# Patient Record
(demographics unavailable — no encounter records)

---

## 2024-11-05 NOTE — HISTORY OF PRESENT ILLNESS
[Parent] : parent [FreeTextEntry1] : follow up  [de-identified] : Nov 5 2024 11:30AM   76 year  old male presents for follow up acc by dtr candy  PMH: HTN HLD< anemia, hugo, DLBCL- seeing hemonc seeing Femi Friend -, DM2 on insulin    hx hep C DJD, mild-moderate spinal stenosis. CAD-CABG  doesnt bring formal med list    lasix prn  only when leg swelling  states not taking gabapentin or remeron   C/o: feeling tired-  last chemo- august-   was recd to see GI and endo at last visit but hasnt seen yet - issues with transportation    sees podiatry - Dr Mike  sees urology- infection - was on antib - pending more tests  Meds:  asa 81  atenolol 100 q d zetia 10  synjardy  losartan 25 metoprolol 50 - vs atenolol 100 ? states taking both   atorvastatin 80 omeprazole 20 amlodipine 10  duloxetine 20  50 un hs  novlog 20 premeal    Allergies: contrast - itchy - one episode -    Health Maintenance: Immunizations:     agreeable to flu shot today      beleives recieved PNA  Social hx/Emotional Health: states doing okay

## 2024-11-05 NOTE — PLAN
[FreeTextEntry1] : EKG obtained to assist in diagnosis and management of assessed problem(s).  - discussed with cardiology - has upcoming appt- ok to wait  pt to stop atenolol and mon BP/HR  parameters given educated re S&S of hyper and hypo tension   and low salt low fat diet   Bp log  monitor and report    vaccine given  Side effects discussed  can use cool, damp cloth to help reduce redness, soreness and/or swelling at immunization site  Patient instructed to notify office with any new or worsening S&S as educated- patient agreeable with plan.    All problems, medications and allergies were assessed and reviewed with the patient. The patients' blood was drawn in office and will be followed and evaluated for any issues. Patient was told to notify the office if any issues arise. Patient agreeable with plan  ate today

## 2024-11-05 NOTE — HISTORY OF PRESENT ILLNESS
[Parent] : parent [FreeTextEntry1] : follow up  [de-identified] : Nov 5 2024 11:30AM   76 year  old male presents for follow up acc by dtr candy  PMH: HTN HLD< anemia, hugo, DLBCL- seeing hemonc seeing Femi Friend -, DM2 on insulin    hx hep C DJD, mild-moderate spinal stenosis. CAD-CABG  doesnt bring formal med list    lasix prn  only when leg swelling  states not taking gabapentin or remeron   C/o: feeling tired-  last chemo- august-   was recd to see GI and endo at last visit but hasnt seen yet - issues with transportation    sees podiatry - Dr Mike  sees urology- infection - was on antib - pending more tests  Meds:  asa 81  atenolol 100 q d zetia 10  synjardy  losartan 25 metoprolol 50 - vs atenolol 100 ? states taking both   atorvastatin 80 omeprazole 20 amlodipine 10  duloxetine 20  50 un hs  novlog 20 premeal    Allergies: contrast - itchy - one episode -    Health Maintenance: Immunizations:     agreeable to flu shot today      beleives recieved PNA  Social hx/Emotional Health: states doing okay

## 2024-12-05 NOTE — DISCUSSION/SUMMARY
[FreeTextEntry1] : S/p CABG: Doing well overall. Some increased dyspnea as of late  Continue aspirin, statin Repeat TTE  HTN: BP decent, continue losartan, metoprolol, amlodipine   HLD: LDL good, TG > 300. Continue statin, zetia. ?Role for fenofibrate. Watch carbs  DVT: Was on Eliquis, now off per onc. Continue to monitor  RV 6M

## 2024-12-05 NOTE — PHYSICAL EXAM

## 2024-12-05 NOTE — HISTORY OF PRESENT ILLNESS
[FreeTextEntry1] : 76M with CAD s/p CABG (2020), HTN, HLD, DM who presents for cardiac eval  Last saw Dr. Vazquez in 11/2023 Pt notes dyspnea x years, maybe worsening as of late Denies CP, lightheadedness Noted in past with episodes of bradycardia  Hx of DVT, was on Eliquis, d/c'ed a few months ago by heme  Hx of diffuse LBCL s/p chemo   Former smoker, many years ago. Retired .   ECG: SR with PAC's, TWI laterally LHC 9/2020: 90% dLM, otherwise mild CAD  TTE 2020: Normal LV function, mild DD  CABG 9/5/20: LIMA to D1, SVG to LAD, Cx (short LIMA- couldn't reach LAD)    Asa 81mg Atorvastatin 80mg Zetia 10mg  Metoprolol 50mg Amlodipine 10mg Losartan 25mg  Synjardy Insulin

## 2024-12-30 NOTE — REASON FOR VISIT
[Follow-Up Visit] : a follow-up visit for [Lymphoma] : lymphoma [Formal Caregiver] : formal caregiver [FreeTextEntry2] : DLBCL

## 2024-12-30 NOTE — ASSESSMENT
[FreeTextEntry1] : 77 yo man with CSIV DLBCL, non-GCB type based on IHC studies reviewed at [facility]. Ki-67 is very high at > 90%. CD30 is expressed. He presented with hypercalcemia and an LDH of 787.He was admitted urgently to complete diagnostic work up. hypercalcemia and treatment of newly diagnosed DLBCL. Patient started cycle 1 with CHOP-R on day 1 (10/10). Course complicated by steroid induced hyperglycemia.  plan:  follow CBC, CMP, LDH neuropathy to feet needles and pins seeing PMRMD L/P x 3 negative  BS better controlled follow with PCP post therapy PET/CT shows completed metabolic response  repeat scan due in 11/ 2024 not done rescheduled  pain RT arm difficult to hold things write or  things will schedule Doppler to evaluate   follow up 3 months

## 2024-12-30 NOTE — HISTORY OF PRESENT ILLNESS
[Disease:__________________________] : Disease: [unfilled] [de-identified] : Mr. Bravo is a 74 yo man with newly diagnosed DLBCL. He has a h/o ASHD (CABG x 3), HTN, HLD, hepatitis C, JAMIE..  Beginning around mid July 2023, he began to develop increased LE weakness, back pain,dyspnea, myalgias, weight loss. He was admitted to [facility]. CTA C/A/P showed prevascular, peripancreatic, jeannie hepatis and retroperitoneal LAD; marked splenomegaly  with multiple splenic masses; there was no evidence of PE; there was an ill defined left basilar infiltrate. MRI L/S spine showed DJD, mild-moderate spinal stenosis. CT guided retroperitoneal node bx showed CD30+ high grade DLBCL.- MUM1, CD20, CD30 (30-35%), BCL-2 and BCL-6 were (+) and CD10, ALK-1, JILL, c-MYC were (-). Ki-67 was > 90%. LDH was 787 on admission, and Ca 12.0. Ca decreased with hydration. FORREST developed post iv contrast, which was reversible. Neurologic assessment for generalized weakness, inability to walk, was inconclusive. MRI of brain showed age related microvascular changes. Before d/c, LE doppler showed right popliteal, post tibial and peroneal veins and anticoagulation was recommended. Path from node bx was not back when pt was sent to [place] for rehab. He remains very weak. He is conversant and lucid. He has not had recent fevers or sweats.      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  interim pet/ct scan showed good response.  post tx PET/CT  scan Compared to FDG PET/CT dated 10/6/2023: results discussed with patient no clear indication from this scan for recent back pain . needs follow uo scans in November wasnt done to be rescheduled needs premeds  smoker from age 24 years till 1 year ago. Daughter tanner is HCP   1. Resolution of FDG-avid lymphadenopathy above and below the diaphragm, FDG-avid intramuscular focus adjacent to left shoulder, splenomegaly, splenic hypermetabolism, and FDG-avid right perinephric soft tissue, compatible with response to interval therapy.  2. Nonspecific diffuse distal small bowel and pancolonic hypermetabolism, increased as compared to prior study, may be related to diabetic medication. Please correlate clinically.  3. The remainder of the study is unremarkable, demonstrating no evidence of FDG-avid disease. The etiology of the patient's back pain and is not delineated on this scan

## 2024-12-30 NOTE — ASSESSMENT
[FreeTextEntry1] : 75 yo man with CSIV DLBCL, non-GCB type based on IHC studies reviewed at [facility]. Ki-67 is very high at > 90%. CD30 is expressed. He presented with hypercalcemia and an LDH of 787.He was admitted urgently to complete diagnostic work up. hypercalcemia and treatment of newly diagnosed DLBCL. Patient started cycle 1 with CHOP-R on day 1 (10/10). Course complicated by steroid induced hyperglycemia.  plan:  follow CBC, CMP, LDH neuropathy to feet needles and pins seeing PMRMD L/P x 3 negative  BS better controlled follow with PCP post therapy PET/CT shows completed metabolic response  repeat scan due in 11/ 2024 not done rescheduled  pain RT arm difficult to hold things write or  things will schedule Doppler to evaluate   follow up 3 months

## 2024-12-30 NOTE — PHYSICAL EXAM
[Restricted in physically strenuous activity but ambulatory and able to carry out work of a light or sedentary nature] : Status 1- Restricted in physically strenuous activity but ambulatory and able to carry out work of a light or sedentary nature, e.g., light house work, office work [Normal] : affect appropriate [de-identified] : uses cane

## 2024-12-30 NOTE — PHYSICAL EXAM
[Restricted in physically strenuous activity but ambulatory and able to carry out work of a light or sedentary nature] : Status 1- Restricted in physically strenuous activity but ambulatory and able to carry out work of a light or sedentary nature, e.g., light house work, office work [Normal] : affect appropriate [de-identified] : uses cane

## 2024-12-30 NOTE — HISTORY OF PRESENT ILLNESS
[Disease:__________________________] : Disease: [unfilled] [de-identified] : Mr. Bravo is a 76 yo man with newly diagnosed DLBCL. He has a h/o ASHD (CABG x 3), HTN, HLD, hepatitis C, JAMIE..  Beginning around mid July 2023, he began to develop increased LE weakness, back pain,dyspnea, myalgias, weight loss. He was admitted to [facility]. CTA C/A/P showed prevascular, peripancreatic, jeannie hepatis and retroperitoneal LAD; marked splenomegaly  with multiple splenic masses; there was no evidence of PE; there was an ill defined left basilar infiltrate. MRI L/S spine showed DJD, mild-moderate spinal stenosis. CT guided retroperitoneal node bx showed CD30+ high grade DLBCL.- MUM1, CD20, CD30 (30-35%), BCL-2 and BCL-6 were (+) and CD10, ALK-1, JILL, c-MYC were (-). Ki-67 was > 90%. LDH was 787 on admission, and Ca 12.0. Ca decreased with hydration. FORREST developed post iv contrast, which was reversible. Neurologic assessment for generalized weakness, inability to walk, was inconclusive. MRI of brain showed age related microvascular changes. Before d/c, LE doppler showed right popliteal, post tibial and peroneal veins and anticoagulation was recommended. Path from node bx was not back when pt was sent to [place] for rehab. He remains very weak. He is conversant and lucid. He has not had recent fevers or sweats.      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  interim pet/ct scan showed good response.  post tx PET/CT  scan Compared to FDG PET/CT dated 10/6/2023: results discussed with patient no clear indication from this scan for recent back pain . needs follow uo scans in November wasnt done to be rescheduled needs premeds  smoker from age 24 years till 1 year ago. Daughter tanner is HCP   1. Resolution of FDG-avid lymphadenopathy above and below the diaphragm, FDG-avid intramuscular focus adjacent to left shoulder, splenomegaly, splenic hypermetabolism, and FDG-avid right perinephric soft tissue, compatible with response to interval therapy.  2. Nonspecific diffuse distal small bowel and pancolonic hypermetabolism, increased as compared to prior study, may be related to diabetic medication. Please correlate clinically.  3. The remainder of the study is unremarkable, demonstrating no evidence of FDG-avid disease. The etiology of the patient's back pain and is not delineated on this scan

## 2025-01-31 NOTE — PHYSICAL EXAM
[Well Developed] : well developed [Well Nourished] : well nourished [No Acute Distress] : no acute distress [Normal Conjunctiva] : normal conjunctiva [Normal Venous Pressure] : normal venous pressure [No Carotid Bruit] : no carotid bruit [Normal S1, S2] : normal S1, S2 [No Murmur] : no murmur [No Rub] : no rub [No Gallop] : no gallop [Clear Lung Fields] : clear lung fields [Normal Gait] : normal gait [No Edema] : no edema [Moves all extremities] : moves all extremities [Normal Speech] : normal speech [Alert and Oriented] : alert and oriented

## 2025-02-02 NOTE — ASSESSMENT
[FreeTextEntry1] : ================================================================= 77 year old man with CAD s/p bypass grafting in 2020, HTN, type 2 diabetes, and DLBCL s/p ~ 287 mg/m2 doxorubicin in 2023.  # Sinus bradycardia, asymptomatic, potentially related to untreated sleep apnea. HR normal currently. - 14 day Zio patch monitor to evaluate for pauses or other symptomatic arrhythmia - educated patient about potential cardiovascular/health effects of JAMIE and recommended he consider treatment - at this time, there seems to be no need to adjust the dose of metoprolol, can review pending results of monitor  # Coronary artery disease s/p bypass 2020: asymptomatic - continue maximal medical therapy under direction of his local cardiologist - cont atorva/ezetimibe - cont aspirin - BP and diabetes control - last lipid panel: 11/6/2024: LDL 31  # At risk for cardiomyopathy due to prior anthracycline chemotherapy for lymphoma - no post-treatment echo - schedule echocardiogram with GLS for post-treatment assessment  # Hypertension, controlled - continue losartan 25 and Toprol XL  50 for now - continue amlodipine 10 mg daily  # DM2: uncontrolled, A1c 8.3 % in Nov 2024 - insulin - meformin-Jardiance combo (Synjardy  5-1000 BID)  Annual follow up with Cardio-Oncology Continue local cardiology care.  Coordinate with daughter, Amairani Fonseca: [phone]  We discussed the above, and all questions were answered to the best of my ability and to the patient's satisfaction.

## 2025-02-02 NOTE — REVIEW OF SYSTEMS
[SOB] : no shortness of breath [Dyspnea on exertion] : not dyspnea during exertion [Chest Discomfort] : no chest discomfort [Lower Ext Edema] : no extremity edema [Palpitations] : no palpitations [Syncope] : no syncope [Joint Pain] : joint pain [Dizziness] : no dizziness [de-identified] : Positive for hand tremors and imbalance

## 2025-02-02 NOTE — HISTORY OF PRESENT ILLNESS
[FreeTextEntry1] : Erik Bravo was referred by his Hematology/Oncologist due to concerns about a slow heart rate.  The patient reports being informed by a home-visiting nurse that his heart rate was slow. He has a history of CAD and prior PCI. In  he presented with chest pain and angiogram disclosed 90 % distal LM disease, for which coronary bypass was performed. He was diagnosed with DLBCL in  and received 6 cycles of RCHOP with intrathecal methotrexate.  The patient experiences ongoing pain/numbness in the right arm following removal of a PICC line last year. The patient also reports a history of imbalance since receiving chemotherapy for lymphoma. He has no history of syncope, dizziness. or feeling light headed. He was diagnosed with obstructive sleep apnea, but has not used CPAP for many years.  He is on good medical therapy for CAD under the guidance of his local cardiologist, Dr. Kinsey.  Lymphoma treatment transpired without clinical cardiotoxicity, but he has not had a follow up echo post-anthracycline therapy.  Past Medical History: - CABG 20: LIMA to D1, SVG to LAD, Cx (short LIMA- couldn't reach LAD) - PCI prior to  - remote LE DVT - Sleep apnea - does not use CPAP - appendectomy - type 2 diabetes, A1c - 8.3 % in 2024  Social History: - Lives with wife in [place] - Former , retired. - former smoker, quit in  - his daughter, Amairani is his HCP and coordinates his medical care  Cardiovascular Summary: ---------------------------------------------- EC2025: sinus w sinus arrhythmia, LVH, anterior infarct age undetermined 2024: NSR w PACs, LVH w repolarization abnormality ---------------------------------------------- Echo: 10/3/2023: EF 61 %, grade 1 diastolic dysfunction, mild-mod MR, normal RV size/function, PASP 40, TR/MR increased from prior : normal LV function, mild diastolic dysfunction ---------------------------------------------- Cath: 2020: LHC  90% dLM, otherwise mild CAD ---------------------------------------------- CT/MRI ---------------------------------------------- Remote/ambulatory rhythm monitoring:

## 2025-02-02 NOTE — REVIEW OF SYSTEMS
[SOB] : no shortness of breath [Dyspnea on exertion] : not dyspnea during exertion [Chest Discomfort] : no chest discomfort [Lower Ext Edema] : no extremity edema [Palpitations] : no palpitations [Syncope] : no syncope [Joint Pain] : joint pain [Dizziness] : no dizziness [de-identified] : Positive for hand tremors and imbalance

## 2025-02-02 NOTE — HISTORY OF PRESENT ILLNESS
[FreeTextEntry1] : Erik Bravo was referred by his Hematology/Oncologist due to concerns about a slow heart rate.  The patient reports being informed by a home-visiting nurse that his heart rate was slow. He has a history of CAD and prior PCI. In  he presented with chest pain and angiogram disclosed 90 % distal LM disease, for which coronary bypass was performed. He was diagnosed with DLBCL in  and received 6 cycles of RCHOP with intrathecal methotrexate.  The patient experiences ongoing pain/numbness in the right arm following removal of a PICC line last year. The patient also reports a history of imbalance since receiving chemotherapy for lymphoma. He has no history of syncope, dizziness. or feeling light headed. He was diagnosed with obstructive sleep apnea, but has not used CPAP for many years.  He is on good medical therapy for CAD under the guidance of his local cardiologist, Dr. Kinsey.  Lymphoma treatment transpired without clinical cardiotoxicity, but he has not had a follow up echo post-anthracycline therapy.  Past Medical History: - CABG 20: LIMA to D1, SVG to LAD, Cx (short LIMA- couldn't reach LAD) - PCI prior to  - remote LE DVT - Sleep apnea - does not use CPAP - appendectomy - type 2 diabetes, A1c - 8.3 % in 2024  Social History: - Lives with wife in [place] - Former , retired. - former smoker, quit in  - his daughter, Amairani is his HCP and coordinates his medical care  Cardiovascular Summary: ---------------------------------------------- EC2025: sinus w sinus arrhythmia, LVH, anterior infarct age undetermined 2024: NSR w PACs, LVH w repolarization abnormality ---------------------------------------------- Echo: 10/3/2023: EF 61 %, grade 1 diastolic dysfunction, mild-mod MR, normal RV size/function, PASP 40, TR/MR increased from prior : normal LV function, mild diastolic dysfunction ---------------------------------------------- Cath: 2020: LHC  90% dLM, otherwise mild CAD ---------------------------------------------- CT/MRI ---------------------------------------------- Remote/ambulatory rhythm monitoring:

## 2025-02-02 NOTE — REASON FOR VISIT
[Family Member] : family member [FreeTextEntry3] : Dr. Friend [FreeTextEntry1] : ================================================================= ASH MARTINES is a 77 year old man with type 2 diabetes, hypertension, HCV, CAD, and DLBCL seen for decreased heart rate.  Prior Cancer Treatments: ------------------------------------------------------------------------ Chemo/targeted therapy: 2023-2/15/2025: JOAQUIMHOP x 6 w intrathecal MTX ------------------------------------------------------------------------

## 2025-02-02 NOTE — ASSESSMENT
[FreeTextEntry1] : ================================================================= 77 year old man with CAD s/p bypass grafting in 2020, HTN, type 2 diabetes, and DLBCL s/p ~ 287 mg/m2 doxorubicin in 2023.  # Sinus bradycardia, asymptomatic, potentially related to untreated sleep apnea. HR normal currently. - 14 day Zio patch monitor to evaluate for pauses or other symptomatic arrhythmia - educated patient about potential cardiovascular/health effects of JAMIE and recommended he consider treatment - at this time, there seems to be no need to adjust the dose of metoprolol, can review pending results of monitor  # Coronary artery disease s/p bypass 2020: asymptomatic - continue maximal medical therapy under direction of his local cardiologist - cont atorva/ezetimibe - cont aspirin - BP and diabetes control - last lipid panel: 11/6/2024: LDL 31  # At risk for cardiomyopathy due to prior anthracycline chemotherapy for lymphoma - no post-treatment echo - schedule echocardiogram with GLS for post-treatment assessment  # Hypertension, controlled - continue losartan 25 and Toprol XL  50 for now - continue amlodipine 10 mg daily  # DM2: uncontrolled, A1c 8.3 % in Nov 2024 - insulin - meformin-Jardiance combo (Synjardy  5-1000 BID)  Annual follow up with Cardio-Oncology Continue local cardiology care.  Coordinate with daughter, Amairani Fonseca: [phone]  We discussed the above, and all questions were answered to the best of my ability and to the patient's satisfaction.

## 2025-02-02 NOTE — REVIEW OF SYSTEMS
[SOB] : no shortness of breath [Dyspnea on exertion] : not dyspnea during exertion [Chest Discomfort] : no chest discomfort [Lower Ext Edema] : no extremity edema [Palpitations] : no palpitations [Syncope] : no syncope [Joint Pain] : joint pain [Dizziness] : no dizziness [de-identified] : Positive for hand tremors and imbalance

## 2025-02-02 NOTE — ASSESSMENT
[FreeTextEntry1] : ================================================================= 77 year old man with CAD s/p bypass grafting in 2020, HTN, type 2 diabetes, and DLBCL s/p ~ 287 mg/m2 doxorubicin in 2023.  # Sinus bradycardia, asymptomatic, potentially related to untreated sleep apnea. HR normal currently. - 14 day Zio patch monitor to evaluate for pauses or other symptomatic arrhythmia - educated patient about potential cardiovascular/health effects of JAMIE and recommended he consider treatment - at this time, there seems to be no need to adjust the dose of metoprolol, can review pending results of monitor  # Coronary artery disease s/p bypass 2020: asymptomatic - continue maximal medical therapy under direction of his local cardiologist - cont atorva/ezetimibe - cont aspirin - BP and diabetes control - last lipid panel: 11/6/2024: LDL 31  # At risk for cardiomyopathy due to prior anthracycline chemotherapy for lymphoma - no post-treatment echo - schedule echocardiogram with GLS for post-treatment assessment  # Hypertension, controlled - continue losartan 25 and Toprol XL  50 for now - continue amlodipine 10 mg daily  # DM2: uncontrolled, A1c 8.3 % in Nov 2024 - insulin - meformin-Jardiance combo (Synjardy  5-1000 BID)  Annual follow up with Cardio-Oncology Continue local cardiology care.  Coordinate with daughter, Amairani Fonseca: [phone]  We discussed the above, and all questions were answered to the best of my ability and to the patient's satisfaction.

## 2025-02-02 NOTE — HISTORY OF PRESENT ILLNESS
[FreeTextEntry1] : Erik Bravo was referred by his Hematology/Oncologist due to concerns about a slow heart rate.  The patient reports being informed by a home-visiting nurse that his heart rate was slow. He has a history of CAD and prior PCI. In  he presented with chest pain and angiogram disclosed 90 % distal LM disease, for which coronary bypass was performed. He was diagnosed with DLBCL in  and received 6 cycles of RCHOP with intrathecal methotrexate.  The patient experiences ongoing pain/numbness in the right arm following removal of a PICC line last year. The patient also reports a history of imbalance since receiving chemotherapy for lymphoma. He has no history of syncope, dizziness. or feeling light headed. He was diagnosed with obstructive sleep apnea, but has not used CPAP for many years.  He is on good medical therapy for CAD under the guidance of his local cardiologist, Dr. Kinsey.  Lymphoma treatment transpired without clinical cardiotoxicity, but he has not had a follow up echo post-anthracycline therapy.  Past Medical History: - CABG 20: LIMA to D1, SVG to LAD, Cx (short LIMA- couldn't reach LAD) - PCI prior to  - remote LE DVT - Sleep apnea - does not use CPAP - appendectomy - type 2 diabetes, A1c - 8.3 % in 2024  Social History: - Lives with wife in [place] - Former , retired. - former smoker, quit in  - his daughter, Amairani is his HCP and coordinates his medical care  Cardiovascular Summary: ---------------------------------------------- EC2025: sinus w sinus arrhythmia, LVH, anterior infarct age undetermined 2024: NSR w PACs, LVH w repolarization abnormality ---------------------------------------------- Echo: 10/3/2023: EF 61 %, grade 1 diastolic dysfunction, mild-mod MR, normal RV size/function, PASP 40, TR/MR increased from prior : normal LV function, mild diastolic dysfunction ---------------------------------------------- Cath: 2020: LHC  90% dLM, otherwise mild CAD ---------------------------------------------- CT/MRI ---------------------------------------------- Remote/ambulatory rhythm monitoring:

## 2025-05-02 NOTE — PHYSICAL EXAM
[Restricted in physically strenuous activity but ambulatory and able to carry out work of a light or sedentary nature] : Status 1- Restricted in physically strenuous activity but ambulatory and able to carry out work of a light or sedentary nature, e.g., light house work, office work [Normal] : normal spine exam without palpable tenderness, no kyphosis or scoliosis [de-identified] : uses cane  [de-identified] : non focal, mildly unsteady gait, using cane

## 2025-05-02 NOTE — REVIEW OF SYSTEMS
[Diarrhea: Grade 0] : Diarrhea: Grade 0 [Dizziness] : dizziness [Negative] : Allergic/Immunologic [Fatigue] : fatigue [Dysphagia] : dysphagia [Fever] : no fever [Night Sweats] : no night sweats [Odynophagia] : no odynophagia [Mucosal Pain] : no mucosal pain [FreeTextEntry4] : dysphagia for solids - throat/upper esophagus [de-identified] : chronic distal LE PN, stable [de-identified] : says glu controlled

## 2025-05-02 NOTE — HISTORY OF PRESENT ILLNESS
[Disease:__________________________] : Disease: [unfilled] [de-identified] : Mr. Bravo is a 74 yo man with newly diagnosed DLBCL. He has a h/o ASHD (CABG x 3), HTN, HLD, hepatitis C, JAMIE..  Beginning around mid July 2023, he began to develop increased LE weakness, back pain,dyspnea, myalgias, weight loss. He was admitted to [facility]. CTA C/A/P showed prevascular, peripancreatic, jeannie hepatis and retroperitoneal LAD; marked splenomegaly  with multiple splenic masses; there was no evidence of PE; there was an ill defined left basilar infiltrate. MRI L/S spine showed DJD, mild-moderate spinal stenosis. CT guided retroperitoneal node bx showed CD30+ high grade DLBCL.- MUM1, CD20, CD30 (30-35%), BCL-2 and BCL-6 were (+) and CD10, ALK-1, JILL, c-MYC were (-). Ki-67 was > 90%. LDH was 787 on admission, and Ca 12.0. Ca decreased with hydration. FORREST developed post iv contrast, which was reversible. Neurologic assessment for generalized weakness, inability to walk, was inconclusive. MRI of brain showed age related microvascular changes. Before d/c, LE doppler showed right popliteal, post tibial and peroneal veins and anticoagulation was recommended. Path from node bx was not back when pt was sent to [place] for rehab. He remains very weak. He is conversant and lucid. He has not had recent fevers or sweats.      [FreeTextEntry1] : R-CHOP x 6 completed 2/15/2024 [de-identified] : DLBCL - completed R-CHOP x 6 with IT MTX x 3 decr in LE PN   post tx PET/CT  5/28/24 c/w FDG PET/CT 10/6/2023:showed complete metabolic response.  planned f/u CT scans in November, did not schedule Rt arm antecubital pain resolved no constitutional c/o less LBP

## 2025-05-02 NOTE — ASSESSMENT
[Curative] : Goals of care discussed with patient: Curative [Palliative Care Plan] : not applicable at this time [FreeTextEntry1] : 76 yo man with CSIV DLBCL, non-GCB type based on IHC studies reviewed at [facility]. Ki-67 was very high at > 90%. CD30 (+). He presented with hypercalcemia and an LDH of 787. Now s/p R-CHOP x 6, IT MTX x 3, completed 2/15/2024 post therapy PET/CT shows completed metabolic response  CBC results reviewed and d/w pt WBC 6.35, Hgb 12.4, Plt 192K, nl diff repeat imaging was due in 11/ 2024 but was not done plan to f/u treated aggressive lymphoma with CT scans up until two yrs post ending therapy PN better Gait unsteady, could benefit from PT dysphagia of unclear cause: exam of oropharynx neg, no problems swallowing liquids. Will need to be seen by ENT after completing his overdue imaging. Plan CT N/C/A/P with contrast, will need steroid premed prior to iv contrast check CMP, LDH, B2MG  RV 3 months

## 2025-05-02 NOTE — HISTORY OF PRESENT ILLNESS
[Disease:__________________________] : Disease: [unfilled] [de-identified] : Mr. Bravo is a 74 yo man with newly diagnosed DLBCL. He has a h/o ASHD (CABG x 3), HTN, HLD, hepatitis C, JAMIE..  Beginning around mid July 2023, he began to develop increased LE weakness, back pain,dyspnea, myalgias, weight loss. He was admitted to [facility]. CTA C/A/P showed prevascular, peripancreatic, jeannie hepatis and retroperitoneal LAD; marked splenomegaly  with multiple splenic masses; there was no evidence of PE; there was an ill defined left basilar infiltrate. MRI L/S spine showed DJD, mild-moderate spinal stenosis. CT guided retroperitoneal node bx showed CD30+ high grade DLBCL.- MUM1, CD20, CD30 (30-35%), BCL-2 and BCL-6 were (+) and CD10, ALK-1, JILL, c-MYC were (-). Ki-67 was > 90%. LDH was 787 on admission, and Ca 12.0. Ca decreased with hydration. FORREST developed post iv contrast, which was reversible. Neurologic assessment for generalized weakness, inability to walk, was inconclusive. MRI of brain showed age related microvascular changes. Before d/c, LE doppler showed right popliteal, post tibial and peroneal veins and anticoagulation was recommended. Path from node bx was not back when pt was sent to [place] for rehab. He remains very weak. He is conversant and lucid. He has not had recent fevers or sweats.      [FreeTextEntry1] : R-CHOP x 6 completed 2/15/2024 [de-identified] : DLBCL - completed R-CHOP x 6 with IT MTX x 3 decr in LE PN   post tx PET/CT  5/28/24 c/w FDG PET/CT 10/6/2023:showed complete metabolic response.  planned f/u CT scans in November, did not schedule Rt arm antecubital pain resolved no constitutional c/o less LBP

## 2025-05-02 NOTE — REVIEW OF SYSTEMS
[Diarrhea: Grade 0] : Diarrhea: Grade 0 [Dizziness] : dizziness [Negative] : Allergic/Immunologic [Fatigue] : fatigue [Dysphagia] : dysphagia [Fever] : no fever [Night Sweats] : no night sweats [Odynophagia] : no odynophagia [Mucosal Pain] : no mucosal pain [FreeTextEntry4] : dysphagia for solids - throat/upper esophagus [de-identified] : chronic distal LE PN, stable [de-identified] : says glu controlled

## 2025-05-02 NOTE — PHYSICAL EXAM
[Restricted in physically strenuous activity but ambulatory and able to carry out work of a light or sedentary nature] : Status 1- Restricted in physically strenuous activity but ambulatory and able to carry out work of a light or sedentary nature, e.g., light house work, office work [Normal] : normal spine exam without palpable tenderness, no kyphosis or scoliosis [de-identified] : uses cane  [de-identified] : non focal, mildly unsteady gait, using cane

## 2025-05-27 NOTE — HISTORY OF PRESENT ILLNESS
[FreeTextEntry1] : F/u   [de-identified] : 77-year-old male presents for re-establishment of primary care after a gap of approximately two years. During this time, he has been under the care of cardiology, hematology, physical medicine & rehab, He denies any acute medical complaints today. No recent hospitalizations or emergency visits reported. Currently in remission from B-cell lymphoma, with last chemotherapy completed in October 2024. Willing to update baseline labs and resume routine follow-up. Cardiology scheduled pt for Echo.

## 2025-05-27 NOTE — HISTORY OF PRESENT ILLNESS
[FreeTextEntry1] : F/u   [de-identified] : 77-year-old male presents for re-establishment of primary care after a gap of approximately two years. During this time, he has been under the care of cardiology, hematology, physical medicine & rehab, He denies any acute medical complaints today. No recent hospitalizations or emergency visits reported. Currently in remission from B-cell lymphoma, with last chemotherapy completed in October 2024. Willing to update baseline labs and resume routine follow-up. Cardiology scheduled pt for Echo.

## 2025-05-27 NOTE — ASSESSMENT
[FreeTextEntry1] : ================================================================= 77 year old man with CAD s/p bypass grafting in 2020, HTN, type 2 diabetes, and DLBCL s/p ~ 287 mg/m2 doxorubicin in 2023.  # Sinus bradycardia, asymptomatic, potentially related to untreated sleep apnea. HR normal currently. - 14 day Zio patch monitor to evaluate for pauses or other symptomatic arrhythmia - educated patient about potential cardiovascular/health effects of JAMIE and recommended he consider treatment - at this time, there seems to be no need to adjust the dose of metoprolol, can review pending results of monitor  # Coronary artery disease s/p bypass 2020: asymptomatic - continue maximal medical therapy under direction of his local cardiologist - cont atorva/ezetimibe - cont aspirin - BP and diabetes control - last lipid panel: 11/6/2024: LDL 31  # At risk for cardiomyopathy due to prior anthracycline chemotherapy for lymphoma - no post-treatment echo - schedule echocardiogram with GLS for post-treatment assessment  # Hypertension, controlled - continue losartan 25 and Toprol XL  50 for now - continue amlodipine 10 mg daily  # DM2: uncontrolled, A1c 8.3 % in Nov 2024 - insulin - meformin-Jardiance combo (Synjardy  5-1000 BID)    We discussed the above, and all questions were answered to the best of my ability and to the patient's satisfaction.

## 2025-06-02 NOTE — DATA REVIEWED
[Other: ___] : [unfilled] [FreeTextEntry1] :  MRI of the lumbar spine performed on September 10, 2023 reveals L5-S1 degenerative disc disease with disc bulge contributing to right and moderate left neuroforaminal stenosis.  Small annular disc bulge L4-5 with out significant stenosis.  BUN 12 creatinine 0.95.Patient should be able to tolerate an NSAID Hemoglobin A1c is 8.2%.  Will need to avoid use of steroids

## 2025-06-02 NOTE — HISTORY OF PRESENT ILLNESS
[FreeTextEntry1] : 76 year old male presents Low back pain and right heel pain  low back pain Pain:  9/10 Worse: 10/10 Quality: sharp  Frequency: constant He fell back wards 1 year ago.  He was admitted to [facility] for cancer- treatment s/p chemotherapy- His lymphoma is in remission. He went to his cancer MD. His cancer remains in rmission.  His pain starts across the low back and radiates into the buttocks.  Rarely the pain radiates below the knee.  He recently had x-rays.  The results were reported negative.  He did not bring the result for my review. The pain is aggravated with bending forward.His knees do not buckle.  He states the pain does not radiate to his foot.  right heel pain Pain:  8/10 Worse: 10/10 Quality: sharp  Frequency: constant The pain starts in the anterior heel.  The pain is worse with weightbearing through the foot.  He also has pain when he takes his first step in the morning.

## 2025-06-02 NOTE — HISTORY OF PRESENT ILLNESS
[FreeTextEntry1] : 76 year old male presents Low back pain and right heel pain  low back pain Pain:  9/10 Worse: 10/10 Quality: sharp  Frequency: constant He fell back wards 1 year ago.  He was admitted to [facility] for cancer- treatment s/p chemotherapy- His lymphoma is in remission. He went to his cancer MD. His cancer remains in rmission.  His pain starts across the low back and radiates into the buttocks.  Rarely the pain radiates below the knee.  He recently had x-rays.  The results were reported negative.  He did not bring the result for my review. The pain is aggravated with bending forward.His knees do not buckle.  He states the pain does not radiate to his foot.  right heel pain Pain:  8/10 Worse: 10/10 Quality: sharp  Frequency: constant The pain starts in the anterior heel.  The pain is worse with weightbearing through the foot.  He also has pain when he takes his first step in the morning.

## 2025-06-02 NOTE — PHYSICAL EXAM
[FreeTextEntry1] : Pleasant, in no distress. Language: English HEENT: Head: no trauma. Eyes: no discharge. Ears: No discharge. Nose No discharge. Throat: clear Neck: FAROM. Negative Spurlings Heart: RR, +S1, S2 Lungs: CTA Abdomen: soft, NT Lumbar spine: FAROM, No paraspinal spasm.  LUE: Shoulder:FAROM, MS 5/5 Elbow: FAROM, MS 5/5 reflexes 2/4 Wrist: FAROM, MS 5/5 reflexes 2/4 Warm, nontender, pulse 2+  RUE:Shoulder:FAROM, MS 5/5 Elbow: FAROM, MS 5/5 reflexes 2/4 Wrist: FAROM, MS 5/5 reflexes 2/4 Warm, nontender, pulse 2+  LLE: Hip: FAROM, MS 5/5 Knee: FAROM, MS 5/5 reflexes 2/4 Ankle: FAROM, MS 5/5 reflexes 2/4 Warm , nontender, pulse 2+ negative homans  RLE: Hip: FAROM, MS 5/5 Knee: FAROM, MS 5/5 reflexes 2/4 Ankle: FAROM, MS 5/5 reflexes 2/4 Warm , nontender, pulse 2+ negative homans  Gait: Spontaneous, reciprocal, safe without an assistive device  Sensation RUE: sensation is intact to light touch, pinprick  and proprioception LUE: sensation is intact to light touch, pinprick  and proprioception RLE: sensation is intact to light touch, pinprick  and proprioception. Neg SLR. Neg RUFUS, Neg TIMIR LLE: sensation is intact to light touch, pinprick  and proprioception. Neg SLR. Neg RUFUS, Neg TIMIR

## 2025-06-09 NOTE — HISTORY OF PRESENT ILLNESS
[FreeTextEntry1] : 77M with CAD s/p CABG (2020), HTN, HLD, DM who presents for follow up  Still notes occasional LANZA Had TTE done last week, results as below Denies CP, lightheadedness Noted in past with episodes of bradycardia Saw Dr. Long cardio-onc for bradycardia, ZIO results with normal HR trends.  Hx of DVT, was on Eliquis, d/c'ed by heme  Hx of diffuse LBCL s/p chemo   Former smoker, many years ago. Retired .   ECG: SR with PAC's, TWI laterally LHC 9/2020: 90% dLM, otherwise mild CAD  TTE 6/2025:  1. Left ventricular cavity is normal in size. Left ventricular wall thickness is normal. Left ventricular systolic function is normal with an ejection fraction of 59 % by Mendoza's method of disks. There are no regional wall motion abnormalities seen. 2. There is mild (grade 1) left ventricular diastolic dysfunction, with normal left ventricular filling pressure. 3. Normal right ventricular cavity size, with normal wall thickness, and normal right ventricular systolic function. Tricuspid annular plane systolic excursion (TAPSE) is 1.9 cm (normal >=1.7 cm). 4. Left atrium is mildly dilated. 5. Mild to moderate mitral regurgitation. 6. Mild pulmonic regurgitation.  CABG 9/5/20: LIMA to D1, SVG to LAD, Cx (short LIMA- couldn't reach LAD)   ZIO 2/2025: 21.6% PAC burden, 4 beats NSVT   Asa 81mg Atorvastatin 80mg Zetia 10mg  Metoprolol 50mg Amlodipine 10mg Losartan 25mg  Synjardy Insulin

## 2025-06-09 NOTE — DISCUSSION/SUMMARY
[FreeTextEntry1] : S/p CABG: Doing well overall. Notes some LANZA, TTE stable  Continue aspirin, statin  HTN: BP decent today, high at times, continue losartan, metoprolol, amlodipine   HLD: LDL good, TG mildly elevated but improved from prior. Continue statin, zetia. ?Role for fenofibrate. Watch carbs  DVT: Was on Eliquis, now off per onc. Continue to monitor  PAC's: 21% burden on ZIO. No symptoms of palpitations, normal LV function. Continue metoprolol. Monitor cardiac function closely   RV 6M

## 2025-06-09 NOTE — PHYSICAL EXAM
[Well Developed] : well developed [Well Nourished] : well nourished [No Acute Distress] : no acute distress [Normal Conjunctiva] : normal conjunctiva [Normal Venous Pressure] : normal venous pressure [No Carotid Bruit] : no carotid bruit [Normal S1, S2] : normal S1, S2 [No Murmur] : no murmur [No Rub] : no rub [No Gallop] : no gallop [Clear Lung Fields] : clear lung fields [No Respiratory Distress] : no respiratory distress  [Good Air Entry] : good air entry [Soft] : abdomen soft [Non Tender] : non-tender [No Masses/organomegaly] : no masses/organomegaly [Normal Bowel Sounds] : normal bowel sounds [No Edema] : no edema [Normal Gait] : normal gait [No Cyanosis] : no cyanosis [No Clubbing] : no clubbing [No Varicosities] : no varicosities [No Rash] : no rash [No Skin Lesions] : no skin lesions [Moves all extremities] : moves all extremities [No Focal Deficits] : no focal deficits [Normal Speech] : normal speech [Alert and Oriented] : alert and oriented [Normal memory] : normal memory [de-identified] : extrasystoles

## 2025-07-23 NOTE — ASSESSMENT
[Curative] : Goals of care discussed with patient: Curative [Palliative Care Plan] : not applicable at this time [FreeTextEntry1] : 78 yo man with CSIV DLBCL, non-GCB type based on IHC studies reviewed at [facility]. Ki-67 was very high at > 90%. CD30 (+). He presented with hypercalcemia and an LDH of 787. Now s/p R-CHOP x 6, IT MTX x 3, completed 2/15/2024 post therapy PET/CT shows completed metabolic response  CBC results reviewed and d/w pt WBC 6.35, Hgb 12.4, Plt 192K, nl diff repeat imaging was due in 11/ 2024 but was not done plan to f/u treated aggressive lymphoma with CT scans up until two yrs post ending therapy PN better Gait unsteady, could benefit from PT dysphagia of unclear cause: exam of oropharynx neg, no problems swallowing liquids. Will need to be seen by ENT after completing his overdue imaging. Plan CT N/C/A/P with contrast, will need steroid premed prior to iv contrast check CMP, LDH, B2MG  RV 3 months

## 2025-07-23 NOTE — HISTORY OF PRESENT ILLNESS
[Disease:__________________________] : Disease: [unfilled] [de-identified] : Mr. Bravo is a 74 yo man with newly diagnosed DLBCL. He has a h/o ASHD (CABG x 3), HTN, HLD, hepatitis C, JAMIE..  Beginning around mid July 2023, he began to develop increased LE weakness, back pain,dyspnea, myalgias, weight loss. He was admitted to [facility]. CTA C/A/P showed prevascular, peripancreatic, jeannie hepatis and retroperitoneal LAD; marked splenomegaly  with multiple splenic masses; there was no evidence of PE; there was an ill defined left basilar infiltrate. MRI L/S spine showed DJD, mild-moderate spinal stenosis. CT guided retroperitoneal node bx showed CD30+ high grade DLBCL.- MUM1, CD20, CD30 (30-35%), BCL-2 and BCL-6 were (+) and CD10, ALK-1, JILL, c-MYC were (-). Ki-67 was > 90%. LDH was 787 on admission, and Ca 12.0. Ca decreased with hydration. FORREST developed post iv contrast, which was reversible. Neurologic assessment for generalized weakness, inability to walk, was inconclusive. MRI of brain showed age related microvascular changes. Before d/c, LE doppler showed right popliteal, post tibial and peroneal veins and anticoagulation was recommended. Path from node bx was not back when pt was sent to [place] for rehab. He remains very weak. He is conversant and lucid. He has not had recent fevers or sweats.      [FreeTextEntry1] : R-CHOP x 6 completed 2/15/2024 [de-identified] : DLBCL - completed R-CHOP x 6 with IT MTX x 3 decr in LE PN   post tx PET/CT  5/28/24 c/w FDG PET/CT 10/6/2023:showed complete metabolic response.  planned f/u CT scans in November, did not schedule Rt arm antecubital pain resolved no constitutional c/o less LBP

## 2025-07-23 NOTE — REVIEW OF SYSTEMS
[Fatigue] : fatigue [Diarrhea: Grade 0] : Diarrhea: Grade 0 [Dizziness] : dizziness [Negative] : Allergic/Immunologic [Dysphagia] : no dysphagia [Muscle Pain] : muscle pain [FreeTextEntry9] : c/o pain right heel c/w plantar fasciitis, seeing podiatrist [Fever] : no fever [Night Sweats] : no night sweats [Odynophagia] : no odynophagia [Mucosal Pain] : no mucosal pain [FreeTextEntry4] : dysphagia for solids - throat/upper esophagus [de-identified] : chronic distal LE PN, stable [de-identified] : says glu controlled

## 2025-07-23 NOTE — PHYSICAL EXAM
[Restricted in physically strenuous activity but ambulatory and able to carry out work of a light or sedentary nature] : Status 1- Restricted in physically strenuous activity but ambulatory and able to carry out work of a light or sedentary nature, e.g., light house work, office work [Normal] : affect appropriate [de-identified] : uses cane  [de-identified] : non focal, mildly unsteady gait, using cane

## 2025-07-23 NOTE — PHYSICAL EXAM
[Restricted in physically strenuous activity but ambulatory and able to carry out work of a light or sedentary nature] : Status 1- Restricted in physically strenuous activity but ambulatory and able to carry out work of a light or sedentary nature, e.g., light house work, office work [Normal] : affect appropriate [de-identified] : uses cane  [de-identified] : non focal, mildly unsteady gait, using cane

## 2025-07-23 NOTE — PHYSICAL EXAM
[Restricted in physically strenuous activity but ambulatory and able to carry out work of a light or sedentary nature] : Status 1- Restricted in physically strenuous activity but ambulatory and able to carry out work of a light or sedentary nature, e.g., light house work, office work [Normal] : affect appropriate [de-identified] : uses cane  [de-identified] : non focal, mildly unsteady gait, using cane

## 2025-07-23 NOTE — REVIEW OF SYSTEMS
[Fatigue] : fatigue [Diarrhea: Grade 0] : Diarrhea: Grade 0 [Dizziness] : dizziness [Negative] : Allergic/Immunologic [Dysphagia] : no dysphagia [Muscle Pain] : muscle pain [FreeTextEntry9] : c/o pain right heel c/w plantar fasciitis, seeing podiatrist [Fever] : no fever [Night Sweats] : no night sweats [Odynophagia] : no odynophagia [Mucosal Pain] : no mucosal pain [FreeTextEntry4] : dysphagia for solids - throat/upper esophagus [de-identified] : chronic distal LE PN, stable [de-identified] : says glu controlled

## 2025-07-23 NOTE — PHYSICAL EXAM
[Restricted in physically strenuous activity but ambulatory and able to carry out work of a light or sedentary nature] : Status 1- Restricted in physically strenuous activity but ambulatory and able to carry out work of a light or sedentary nature, e.g., light house work, office work [Normal] : affect appropriate [de-identified] : uses cane  [de-identified] : non focal, mildly unsteady gait, using cane

## 2025-07-23 NOTE — HISTORY OF PRESENT ILLNESS
[Disease:__________________________] : Disease: [unfilled] [de-identified] : Mr. Bravo is a 76 yo man with newly diagnosed DLBCL. He has a h/o ASHD (CABG x 3), HTN, HLD, hepatitis C, JAMIE..  Beginning around mid July 2023, he began to develop increased LE weakness, back pain,dyspnea, myalgias, weight loss. He was admitted to [facility]. CTA C/A/P showed prevascular, peripancreatic, jeannie hepatis and retroperitoneal LAD; marked splenomegaly  with multiple splenic masses; there was no evidence of PE; there was an ill defined left basilar infiltrate. MRI L/S spine showed DJD, mild-moderate spinal stenosis. CT guided retroperitoneal node bx showed CD30+ high grade DLBCL.- MUM1, CD20, CD30 (30-35%), BCL-2 and BCL-6 were (+) and CD10, ALK-1, JILL, c-MYC were (-). Ki-67 was > 90%. LDH was 787 on admission, and Ca 12.0. Ca decreased with hydration. FORREST developed post iv contrast, which was reversible. Neurologic assessment for generalized weakness, inability to walk, was inconclusive. MRI of brain showed age related microvascular changes. Before d/c, LE doppler showed right popliteal, post tibial and peroneal veins and anticoagulation was recommended. Path from node bx was not back when pt was sent to [place] for rehab. He remains very weak. He is conversant and lucid. He has not had recent fevers or sweats.      [FreeTextEntry1] : R-CHOP x 6 completed 2/15/2024 [de-identified] : DLBCL - completed R-CHOP x 6 with IT MTX x 3 decr in LE PN   post tx PET/CT  5/28/24 c/w FDG PET/CT 10/6/2023:showed complete metabolic response.  planned f/u CT scans in November, did not schedule Rt arm antecubital pain resolved no constitutional c/o less LBP

## 2025-07-23 NOTE — REVIEW OF SYSTEMS
[Fatigue] : fatigue [Diarrhea: Grade 0] : Diarrhea: Grade 0 [Dizziness] : dizziness [Negative] : Allergic/Immunologic [Dysphagia] : no dysphagia [Muscle Pain] : muscle pain [FreeTextEntry9] : c/o pain right heel c/w plantar fasciitis, seeing podiatrist [Fever] : no fever [Night Sweats] : no night sweats [Odynophagia] : no odynophagia [Mucosal Pain] : no mucosal pain [FreeTextEntry4] : dysphagia for solids - throat/upper esophagus [de-identified] : chronic distal LE PN, stable [de-identified] : says glu controlled

## 2025-07-23 NOTE — HISTORY OF PRESENT ILLNESS
[Disease:__________________________] : Disease: [unfilled] [de-identified] : Mr. Bravo is a 74 yo man with newly diagnosed DLBCL. He has a h/o ASHD (CABG x 3), HTN, HLD, hepatitis C, JAMIE..  Beginning around mid July 2023, he began to develop increased LE weakness, back pain,dyspnea, myalgias, weight loss. He was admitted to [facility]. CTA C/A/P showed prevascular, peripancreatic, jeannie hepatis and retroperitoneal LAD; marked splenomegaly  with multiple splenic masses; there was no evidence of PE; there was an ill defined left basilar infiltrate. MRI L/S spine showed DJD, mild-moderate spinal stenosis. CT guided retroperitoneal node bx showed CD30+ high grade DLBCL.- MUM1, CD20, CD30 (30-35%), BCL-2 and BCL-6 were (+) and CD10, ALK-1, JILL, c-MYC were (-). Ki-67 was > 90%. LDH was 787 on admission, and Ca 12.0. Ca decreased with hydration. FORREST developed post iv contrast, which was reversible. Neurologic assessment for generalized weakness, inability to walk, was inconclusive. MRI of brain showed age related microvascular changes. Before d/c, LE doppler showed right popliteal, post tibial and peroneal veins and anticoagulation was recommended. Path from node bx was not back when pt was sent to [place] for rehab. He remains very weak. He is conversant and lucid. He has not had recent fevers or sweats.      [FreeTextEntry1] : R-CHOP x 6 completed 2/15/2024 [de-identified] : DLBCL - completed R-CHOP x 6 with IT MTX x 3 decr in LE PN   post tx PET/CT  5/28/24 c/w FDG PET/CT 10/6/2023:showed complete metabolic response.  planned f/u CT scans in November, did not schedule Rt arm antecubital pain resolved no constitutional c/o less LBP

## 2025-07-23 NOTE — REVIEW OF SYSTEMS
[Fatigue] : fatigue [Diarrhea: Grade 0] : Diarrhea: Grade 0 [Dizziness] : dizziness [Negative] : Allergic/Immunologic [Dysphagia] : no dysphagia [Muscle Pain] : muscle pain [FreeTextEntry9] : c/o pain right heel c/w plantar fasciitis, seeing podiatrist [Fever] : no fever [Night Sweats] : no night sweats [Odynophagia] : no odynophagia [Mucosal Pain] : no mucosal pain [FreeTextEntry4] : dysphagia for solids - throat/upper esophagus [de-identified] : chronic distal LE PN, stable [de-identified] : says glu controlled

## 2025-07-23 NOTE — HISTORY OF PRESENT ILLNESS
[Disease:__________________________] : Disease: [unfilled] [de-identified] : Mr. Bravo is a 74 yo man with newly diagnosed DLBCL. He has a h/o ASHD (CABG x 3), HTN, HLD, hepatitis C, JAMIE..  Beginning around mid July 2023, he began to develop increased LE weakness, back pain,dyspnea, myalgias, weight loss. He was admitted to [facility]. CTA C/A/P showed prevascular, peripancreatic, jeannie hepatis and retroperitoneal LAD; marked splenomegaly  with multiple splenic masses; there was no evidence of PE; there was an ill defined left basilar infiltrate. MRI L/S spine showed DJD, mild-moderate spinal stenosis. CT guided retroperitoneal node bx showed CD30+ high grade DLBCL.- MUM1, CD20, CD30 (30-35%), BCL-2 and BCL-6 were (+) and CD10, ALK-1, JILL, c-MYC were (-). Ki-67 was > 90%. LDH was 787 on admission, and Ca 12.0. Ca decreased with hydration. FORREST developed post iv contrast, which was reversible. Neurologic assessment for generalized weakness, inability to walk, was inconclusive. MRI of brain showed age related microvascular changes. Before d/c, LE doppler showed right popliteal, post tibial and peroneal veins and anticoagulation was recommended. Path from node bx was not back when pt was sent to [place] for rehab. He remains very weak. He is conversant and lucid. He has not had recent fevers or sweats.      [FreeTextEntry1] : R-CHOP x 6 completed 2/15/2024 [de-identified] : DLBCL - completed R-CHOP x 6 with IT MTX x 3 decr in LE PN   post tx PET/CT  5/28/24 c/w FDG PET/CT 10/6/2023:showed complete metabolic response.  planned f/u CT scans in November, did not schedule Rt arm antecubital pain resolved no constitutional c/o less LBP